# Patient Record
Sex: MALE | Race: WHITE | Employment: OTHER | URBAN - METROPOLITAN AREA
[De-identification: names, ages, dates, MRNs, and addresses within clinical notes are randomized per-mention and may not be internally consistent; named-entity substitution may affect disease eponyms.]

---

## 2020-12-16 PROBLEM — K62.5 RECTAL HEMORRHAGE: Status: ACTIVE | Noted: 2018-09-05

## 2020-12-16 PROBLEM — K92.2 LOWER GASTROINTESTINAL HEMORRHAGE: Status: ACTIVE | Noted: 2018-09-05

## 2020-12-16 PROBLEM — Z86.010 HISTORY OF COLONIC POLYPS: Status: ACTIVE | Noted: 2020-12-16

## 2020-12-16 PROBLEM — Z00.00 WELL ADULT HEALTH CHECK: Status: ACTIVE | Noted: 2017-01-03

## 2020-12-16 PROBLEM — R97.20 RAISED PROSTATE SPECIFIC ANTIGEN: Status: ACTIVE | Noted: 2017-05-03

## 2020-12-16 PROBLEM — C67.9 PRIMARY MALIGNANT NEOPLASM OF BLADDER (HCC): Status: ACTIVE | Noted: 2020-12-16

## 2020-12-16 PROBLEM — I26.99 PULMONARY EMBOLISM (HCC): Status: ACTIVE | Noted: 2020-12-16

## 2020-12-16 PROBLEM — E66.9 OBESITY: Status: ACTIVE | Noted: 2020-12-16

## 2020-12-16 PROBLEM — G47.30 SLEEP APNEA: Status: ACTIVE | Noted: 2020-12-16

## 2020-12-16 PROBLEM — I82.409 DEEP VEIN THROMBOSIS OF LOWER EXTREMITY (HCC): Status: ACTIVE | Noted: 2020-12-16

## 2020-12-16 PROBLEM — IMO0002 PROBLEM: Status: ACTIVE | Noted: 2020-12-16

## 2020-12-16 PROBLEM — E78.2 MIXED HYPERLIPIDEMIA: Status: ACTIVE | Noted: 2020-12-16

## 2020-12-23 RX ORDER — FERROUS SULFATE TAB EC 324 MG (65 MG FE EQUIVALENT) 324 (65 FE) MG
TABLET DELAYED RESPONSE ORAL
COMMUNITY

## 2020-12-28 ENCOUNTER — LAB ENCOUNTER (OUTPATIENT)
Dept: LAB | Facility: HOSPITAL | Age: 70
End: 2020-12-28
Attending: INTERNAL MEDICINE
Payer: MEDICARE

## 2020-12-28 DIAGNOSIS — Z01.818 PRE-OP TESTING: ICD-10-CM

## 2020-12-29 LAB — SARS-COV-2 RNA RESP QL NAA+PROBE: NOT DETECTED

## 2020-12-31 ENCOUNTER — ANESTHESIA (OUTPATIENT)
Dept: ENDOSCOPY | Facility: HOSPITAL | Age: 70
End: 2020-12-31
Payer: MEDICARE

## 2020-12-31 ENCOUNTER — ANESTHESIA EVENT (OUTPATIENT)
Dept: ENDOSCOPY | Facility: HOSPITAL | Age: 70
End: 2020-12-31
Payer: MEDICARE

## 2020-12-31 ENCOUNTER — HOSPITAL ENCOUNTER (OUTPATIENT)
Facility: HOSPITAL | Age: 70
Setting detail: HOSPITAL OUTPATIENT SURGERY
Discharge: HOME | End: 2020-12-31
Attending: INTERNAL MEDICINE | Admitting: INTERNAL MEDICINE
Payer: MEDICARE

## 2020-12-31 VITALS
RESPIRATION RATE: 18 BRPM | WEIGHT: 270 LBS | TEMPERATURE: 98 F | SYSTOLIC BLOOD PRESSURE: 125 MMHG | HEART RATE: 62 BPM | DIASTOLIC BLOOD PRESSURE: 87 MMHG | OXYGEN SATURATION: 99 % | BODY MASS INDEX: 34.65 KG/M2 | HEIGHT: 74 IN

## 2020-12-31 DIAGNOSIS — Z01.818 PRE-OP TESTING: Primary | ICD-10-CM

## 2020-12-31 DIAGNOSIS — C16.9 GASTRIC ADENOCARCINOMA (HCC): ICD-10-CM

## 2020-12-31 PROCEDURE — 88305 TISSUE EXAM BY PATHOLOGIST: CPT | Performed by: INTERNAL MEDICINE

## 2020-12-31 PROCEDURE — 0DB78ZX EXCISION OF STOMACH, PYLORUS, VIA NATURAL OR ARTIFICIAL OPENING ENDOSCOPIC, DIAGNOSTIC: ICD-10-PCS | Performed by: INTERNAL MEDICINE

## 2020-12-31 PROCEDURE — 88312 SPECIAL STAINS GROUP 1: CPT | Performed by: INTERNAL MEDICINE

## 2020-12-31 PROCEDURE — 0DJ08ZZ INSPECTION OF UPPER INTESTINAL TRACT, VIA NATURAL OR ARTIFICIAL OPENING ENDOSCOPIC: ICD-10-PCS | Performed by: INTERNAL MEDICINE

## 2020-12-31 PROCEDURE — 3E0G8GC INTRODUCTION OF OTHER THERAPEUTIC SUBSTANCE INTO UPPER GI, VIA NATURAL OR ARTIFICIAL OPENING ENDOSCOPIC: ICD-10-PCS | Performed by: INTERNAL MEDICINE

## 2020-12-31 RX ORDER — LIDOCAINE HYDROCHLORIDE 10 MG/ML
INJECTION, SOLUTION EPIDURAL; INFILTRATION; INTRACAUDAL; PERINEURAL AS NEEDED
Status: DISCONTINUED | OUTPATIENT
Start: 2020-12-31 | End: 2020-12-31 | Stop reason: SURG

## 2020-12-31 RX ORDER — SODIUM CHLORIDE, SODIUM LACTATE, POTASSIUM CHLORIDE, CALCIUM CHLORIDE 600; 310; 30; 20 MG/100ML; MG/100ML; MG/100ML; MG/100ML
INJECTION, SOLUTION INTRAVENOUS CONTINUOUS
Status: DISCONTINUED | OUTPATIENT
Start: 2020-12-31 | End: 2020-12-31

## 2020-12-31 RX ADMIN — LIDOCAINE HYDROCHLORIDE 50 MG: 10 INJECTION, SOLUTION EPIDURAL; INFILTRATION; INTRACAUDAL; PERINEURAL at 10:04:00

## 2020-12-31 RX ADMIN — SODIUM CHLORIDE, SODIUM LACTATE, POTASSIUM CHLORIDE, CALCIUM CHLORIDE: 600; 310; 30; 20 INJECTION, SOLUTION INTRAVENOUS at 10:04:00

## 2020-12-31 NOTE — OPERATIVE REPORT
OPERATIVE REPORT   PATIENT NAME: Tiffani Martínez  MRN: [id number]  DATE OF OPERATION: 12/31/2020  PREOPERATIVE DIAGNOSIS:   1. Gastric cancer, patient is here for local regional staging by endoscopic ultrasound.   PET CT scan done at an outside facility d stomach adequately with CO2 insufflation without any restriction. The cardia and fundus were inspected retroflexion and without obvious abnormality seen. The pylorus was patent we able to advance the scope across it without any difficulty.     Random biop anticoagulation. 4. Continue PPI for now.   Jeison Issa MD

## 2020-12-31 NOTE — H&P
[facility] Department of  Gastroenterology  Update Health History :       Jorge Chew  male   Lin Guerrero MD     [id number]  3/3/1950 Primary Care Physician  Antoni Hurt MD        HPI :  Gastric cancer without obvious distant metas visit.    Physical Exam:    GENERAL: well developed, well nourished, in no apparent distress   HEENT: PERRLA,  clear   NECK: supple,    LUNGS: clear to auscultation   CARDIO: RRR without murmur   GI: good BS's and no masses, HSM or tenderness   RECTAL: Exa

## 2020-12-31 NOTE — ANESTHESIA PREPROCEDURE EVALUATION
Anesthesia PreOp Note    HPI:     Idalia Cunningham is a 65year old male who presents for preoperative consultation requested by: Adriana Dawkins MD    Date of Surgery: 12/31/2020    Procedure(s):  ESOPHAGOGASTRODUODENOSCOPY (EGD)  ENDOSCOPIC ULTRASOUND Procedure Laterality Date   • CANCER ANTIGEN (CA) 125     • COLONOSCOPY  12/05/2020    by    • EGD  12/04/2020    by          •  Ferrous Sulfate 324 (65 Fe) MG Oral Tab EC, Take by mouth., Disp: , Rfl: , 12/24/2020    •  psyllium 28 % Lifestyle      Physical activity        Days per week: Not on file        Minutes per session: Not on file      Stress: Not on file    Relationships      Social connections        Talks on phone: Not on file        Gets together: Not on file        Attends relevant, major complications, and any alternative forms of anesthetic management. All of the patient's questions were answered to the best of my ability. The patient desires the anesthetic management as planned.   Catie Raymundo  12/31/2020 9:46 AM

## 2020-12-31 NOTE — ANESTHESIA POSTPROCEDURE EVALUATION
Patient: Bianca Coronel    Procedure Summary     Date: 12/31/20 Room / Location: [facility] ENDOSCOPY 01 / [facility] END[facility]COPY    Anesthesia Start: 1004 Anesthesia Stop: 1798    Procedures:       ESOPHAGOGASTRODUODENOSCOPY (EGD) (N/A )      ENDOSCOPIC ULTRASOUND (E

## 2021-01-02 NOTE — PROGRESS NOTES
1/2/2021  [place] [id number]    Dear Brent Ji,     Here are the biopsy/pathology findings from your recent EGD (Upper  Endoscopy):  1. Stomach biopsies negative for H Pylori bacteria.    2.  T1bN0 g

## 2023-12-22 NOTE — LETTER
1/2/2021          [place] [id number]    Dear Miesha Yi,     Here are the biopsy/pathology findings from your recent EGD (Upper  Endoscopy):  1. Stomach biopsies negative for H Pylori bacteria. no

## (undated) DEVICE — LINE MNTR ADLT SET O2 INTMD

## (undated) DEVICE — NEEDLE CONTRAST INTERJECT 25G

## (undated) DEVICE — Device: Brand: CUSTOM PROCEDURE KIT

## (undated) DEVICE — Device: Brand: SPOT EX ENDOSCOPIC TATTOO

## (undated) DEVICE — CANNULA NASAL 02/C02 ADULT

## (undated) DEVICE — MEDI-VAC NON-CONDUCTIVE SUCTION TUBING 6MM X 1.8M (6FT.) L: Brand: CARDINAL HEALTH

## (undated) DEVICE — CONMED SCOPE SAVER BITE BLOCK, 20X27 MM: Brand: SCOPE SAVER

## (undated) DEVICE — Device: Brand: DEFENDO AIR/WATER/SUCTION AND BIOPSY VALVE

## (undated) NOTE — LETTER
[facility]  Administration of Anesthesia  1. I, Victoria Pedraza, or _________________________________ acting on his behalf, (Patient) (Dependent/Representative) request to receive anesthesia for my pending procedure/operation/treatment. 6. OBSTETRIC PATIENTS: Specific risks/consequences of spinal/epidural anesthesia may include itching, low blood pressure, difficulty urinating, slowing of the baby's heart rate and headache.  Rare risks include infections, high spinal block, spinal bleeding ___________________________________________________           _____________________________________________________  Date/Time                                                                                               Responsible person in case of minor